# Patient Record
Sex: FEMALE | Race: WHITE | Employment: STUDENT | URBAN - METROPOLITAN AREA
[De-identification: names, ages, dates, MRNs, and addresses within clinical notes are randomized per-mention and may not be internally consistent; named-entity substitution may affect disease eponyms.]

---
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
## 2025-03-25 RX ORDER — SPIRONOLACTONE 50 MG/1
50 TABLET, FILM COATED ORAL 2 TIMES DAILY
Qty: 180 TABLET | Refills: 0 | OUTPATIENT
Start: 2025-03-25

## 2025-04-21 RX ORDER — SPIRONOLACTONE 50 MG/1
50 TABLET, FILM COATED ORAL 2 TIMES DAILY
Qty: 180 TABLET | Refills: 0 | OUTPATIENT
Start: 2025-04-21

## 2025-04-21 RX ORDER — MONTELUKAST SODIUM 10 MG/1
10 TABLET ORAL NIGHTLY
Qty: 90 TABLET | Refills: 0 | OUTPATIENT
Start: 2025-04-21

## 2025-04-23 ENCOUNTER — TELEPHONE (OUTPATIENT)
Dept: OBGYN CLINIC | Facility: CLINIC | Age: 20
End: 2025-04-23

## 2025-04-23 NOTE — TELEPHONE ENCOUNTER
Pt name and  verified     Pt reports partner has oral herpes and now pt possibly has it. Pt has kylena IUD inserted on 2023. Pt states she has a whole body rash for the past 2 months and wondering if related to the IUD or herpes. Pt denies starting new medications or other allergies. Informed pt the IUD was probably not the cause but pt should f/u with PCP for an evaluation of rash. Pt verbalized understanding and agreed.  Pt has not been seen since 2023. Pt scheduled for annual on  with Dr. Church. Pt aware of scheduling details.

## 2025-05-12 ENCOUNTER — OFFICE VISIT (OUTPATIENT)
Dept: FAMILY MEDICINE CLINIC | Facility: CLINIC | Age: 20
End: 2025-05-12
Payer: COMMERCIAL

## 2025-05-12 VITALS
RESPIRATION RATE: 18 BRPM | SYSTOLIC BLOOD PRESSURE: 106 MMHG | DIASTOLIC BLOOD PRESSURE: 70 MMHG | WEIGHT: 164 LBS | TEMPERATURE: 98 F | OXYGEN SATURATION: 98 % | HEART RATE: 95 BPM | HEIGHT: 70 IN | BODY MASS INDEX: 23.48 KG/M2

## 2025-05-12 DIAGNOSIS — J01.00 ACUTE NON-RECURRENT MAXILLARY SINUSITIS: Primary | ICD-10-CM

## 2025-05-12 DIAGNOSIS — J02.9 SORE THROAT: ICD-10-CM

## 2025-05-12 LAB
CONTROL LINE PRESENT WITH A CLEAR BACKGROUND (YES/NO): YES YES/NO
KIT LOT #: NORMAL NUMERIC

## 2025-05-12 RX ORDER — HYDROXYZINE HYDROCHLORIDE 25 MG/1
TABLET, FILM COATED ORAL
COMMUNITY
Start: 2025-03-19

## 2025-05-12 RX ORDER — AZITHROMYCIN 250 MG/1
TABLET, FILM COATED ORAL
COMMUNITY
Start: 2025-03-19 | End: 2025-05-12

## 2025-05-12 RX ORDER — AMOXICILLIN 500 MG/1
CAPSULE ORAL
COMMUNITY
End: 2025-05-12

## 2025-05-12 RX ORDER — FEXOFENADINE HCL 60 MG/1
60 TABLET, FILM COATED ORAL 2 TIMES DAILY
COMMUNITY

## 2025-05-12 RX ORDER — CEFADROXIL 500 MG/1
500 CAPSULE ORAL 2 TIMES DAILY
COMMUNITY
Start: 2025-02-02 | End: 2025-05-12

## 2025-05-12 NOTE — PROGRESS NOTES
CHIEF COMPLAINT:     Chief Complaint   Patient presents with    Sinus Problem     X 2 weeks, sore throat worsening 1 week ago, sinus pressure, mucus       HPI:   Yefri Melendez is a 19 year old female who presents for concerns of a sinus infection. Patient reports sinus pressure/congestion and thick nasal drainage along with sore throat. Sinus symtpoms x 2 weeks. .  Denies any fevers, wheezing, chest discomfort, or shortness of breath. .  Treating symptoms with otc cold meds.   Tolerates PO well at home. No n/v/d.  Denies any other aggravating or relieving factors at home. Denies any other treatment attempts prior to arrival.  Denies known covid-19 or strep exposure.      Current Medications[1]   Past Medical History[2]   Past Surgical History[3]      Short Social Hx on File[4]      REVIEW OF SYSTEMS:   GENERAL: Denies fever. Notes good appetite  SKIN: no rashes or abnormal skin lesions  HEENT: + sore throat, + sinus symptoms, Denies ear pain  LUNGS: Denies cough, denies shortness of breath or wheezing,   CARDIOVASCULAR: denies chest pain or palpitations   GI: denies N/V/C or abdominal pain  NEURO: Denies headaches    EXAM:   /70   Pulse 95   Temp 97.7 °F (36.5 °C)   Resp 18   Ht 5' 10\" (1.778 m)   Wt 164 lb (74.4 kg)   LMP 04/24/2025 (Exact Date)   SpO2 98%   BMI 23.53 kg/m²   GENERAL: well developed, well nourished,in no apparent distress  SKIN: no rashes,no suspicious lesions  HEAD: atraumatic, normocephalic.    EYES: conjunctiva clear, EOM intact  EARS: TM's intact and without erythema, no bulging, no retraction,no fluid, bony landmarks visualized. No erythema or swelling noted to ear canals or external ears.   NOSE: Nostrils patent, dried yellow nasal mucous, nasal mucosa reddened   THROAT: Oral mucosa pink, moist. Posterior pharynx is erythematous. No exudates. No tonsillar hypertrophy noted.  No trismus. Uvula midline with no swelling. Voice clear/normal. No stridor  NECK: Supple, non-tender  LUNGS:  clear to auscultation bilaterally, no rales, wheezes or rhonchi. Breathing is non labored.  CARDIO: RRR without murmur  EXTREMITIES: no cyanosis, clubbing or edema  LYMPH:  No lymphadenopathy.        ASSESSMENT AND PLAN:       ICD-10-CM    1. Acute non-recurrent maxillary sinusitis  J01.00 amoxicillin clavulanate 875-125 MG Oral Tab      2. Sore throat  J02.9 Strep A Assay W/Optic        Rapid strep negative    Discussed physical exam and hpi with pt. Pt has reassuring physical exam consistent with sinusitis. We discussed viral vs allergy vs bacterial etiologies associated with sinusitis. Pt notes she would like to start abx today and declined delayed antimicrobial therapy option. Treatment options discussed with patient and explained in detail. Will start augmentin today along with supportive care. (Will stop minocycline while taking augmentin). The risks, benefits and potential side effects of possible medications were reviewed. Alternatives were discussed. Monitoring parameters and expected course outlined. Patient to call PCP or go to emergency department if symptoms fail to respond as outlined, or worsen in any way. The patient agreed with the plan.       (We discussed her allergy to cephalosporins and the relationship between pcn's and cephalosporins. Pt verbalized understanding and notes she has tolerated augmentin well in the past. I verified on pt's chart she has had augmentin before.)       Patient Instructions   1. Rest. Drink plenty of fluids.     2. Supportive care as discussed.     3. Augmentin as prescribed. (Stop minocycline while taking augmentin. Resume after completion of augmentin.)    4. Follow up with PMD in 4-5 days for re-eval. Go to the emergency department immediately if symptoms worsen, change, you develop chest discomfort, wheezing, shortness of breath, or if you have any concerns.             [1]   Current Outpatient Medications   Medication Sig Dispense Refill    amoxicillin clavulanate  875-125 MG Oral Tab Take 1 tablet by mouth 2 (two) times daily for 10 days. 20 tablet 0    fexofenadine 60 MG Oral Tab Take 1 tablet (60 mg total) by mouth 2 (two) times daily. (Patient not taking: Reported on 5/12/2025)      hydrOXYzine 25 MG Oral Tab Take 1-2 tabs po q6 hrs prn itching/rash      spironolactone 50 MG Oral Tab Take 1 tablet (50 mg total) by mouth 2 (two) times daily. 180 tablet 0    montelukast (SINGULAIR) 10 MG Oral Tab Take 1 tablet (10 mg total) by mouth nightly. 90 tablet 0    minocycline 100 MG Oral Cap Take 1 capsule (100 mg total) by mouth daily. 30 capsule 2    levonorgestrel (KYLEENA) 19.5 MG Intrauterine IUD 19,500 mcg (1 each total) by Intrauterine route one time. Insertion 05/30/2023     [2]   Past Medical History:   Ileus following gastrointestinal surgery (HCC)    Perforated appendicitis   [3]   Past Surgical History:  Procedure Laterality Date    Appendectomy      Hernia surgery      Repair ing hernia,5+y/o,reducibl     [4]   Social History  Socioeconomic History    Marital status: Single   Tobacco Use    Smoking status: Never    Smokeless tobacco: Never   Vaping Use    Vaping status: Never Used   Substance and Sexual Activity    Alcohol use: Never    Drug use: Never     Social Drivers of Health      Received from [facility]    Housing Stability

## 2025-05-12 NOTE — PATIENT INSTRUCTIONS
1. Rest. Drink plenty of fluids.     2. Supportive care as discussed.     3. Augmentin as prescribed. (Stop minocycline while taking augmentin. Resume after completion of augmentin.)    4. Follow up with PMD in 4-5 days for re-eval. Go to the emergency department immediately if symptoms worsen, change, you develop chest discomfort, wheezing, shortness of breath, or if you have any concerns.

## 2025-05-22 ENCOUNTER — OFFICE VISIT (OUTPATIENT)
Dept: OBGYN CLINIC | Facility: CLINIC | Age: 20
End: 2025-05-22
Payer: COMMERCIAL

## 2025-05-22 VITALS
HEIGHT: 70 IN | WEIGHT: 166 LBS | SYSTOLIC BLOOD PRESSURE: 116 MMHG | BODY MASS INDEX: 23.77 KG/M2 | DIASTOLIC BLOOD PRESSURE: 74 MMHG

## 2025-05-22 DIAGNOSIS — T78.3XXA IDIOPATHIC ANGIOEDEMA, INITIAL ENCOUNTER: ICD-10-CM

## 2025-05-22 DIAGNOSIS — Z01.419 ENCOUNTER FOR WELL WOMAN EXAM WITH ROUTINE GYNECOLOGICAL EXAM: Primary | ICD-10-CM

## 2025-05-22 PROCEDURE — 99395 PREV VISIT EST AGE 18-39: CPT | Performed by: OBSTETRICS & GYNECOLOGY

## 2025-05-22 NOTE — PROGRESS NOTES
[facility]  Obstetrics and Gynecology  Gynecology Visit    Chief Complaint   Patient presents with    Annual     Reviewed Preventative/Wellness form with patient.           Yefri Melendez is a 19 year old female who presents for Pt presents for annual.    LMP: 4/24/2025.    Menses regular: yes.    Menstrual flow normal: normal.    Birth control or HRT:  Kyleena.   Refill 0  Last Pap Smear: 0.  Any history of abnormal paps: 0   Last MMG: n/a  Any Medication Refills needed today?: none  Sleep: 8-10 hours.    Diet: balanced.    Exercise: yes.   Screening labs/Blood work today: n/a.     Colonoscopy (if over 44 y/o): n/a.   Gardasil:(age 9-44 y/o) up to date.   Genetic Cancer screen (if indicated): n/a.   Flu (Aug-April): 2024.TDAP (every 10 years) 2016.      Additional Problems/concerns: no other concerns.      Next Appt: will call    Immunization History   Administered Date(s) Administered    Covid-19 Vaccine Pfizer 30 mcg/0.3 ml 05/26/2021, 06/16/2021    Covid-19 Vaccine Pfizer Pablo-Sucrose 30 mcg/0.3 ml 02/22/2022    DTAP 12/18/2006    DTAP-IPV 05/17/2010    DTAP/HEP B/IPV Combined 08/17/2005, 10/12/2005, 12/12/2005    FLULAVAL 6 months & older 0.5 ml Prefilled syringe (78043) 10/27/2022    FLUZONE 6 months and older PFS 0.5 ml (73829) 09/21/2013, 09/12/2015, 10/29/2016, 10/14/2019, 10/04/2020, 11/01/2021    Fluvirin, 3 Years & >, Im 09/27/2011, 10/20/2012, 09/21/2013, 09/17/2014    HEP A,Ped/Adol,(2 Dose) 06/13/2007    HEP A,Ped/Adol,(3 Dose) 06/12/2006    HIB 08/17/2005, 10/12/2005, 12/18/2006    Hpv Virus Vaccine 9 Corina Im 07/25/2016, 01/28/2017    Influenza 10/20/2012    MMR 09/13/2006    MMR/Varicella Combined 05/17/2010    Meningococcal B, Omv 07/05/2023    Meningococcal-Menactra 07/25/2016, 07/14/2021    Pneumococcal (Prevnar 13) 05/17/2010    Pneumococcal (Prevnar 7) 08/17/2005, 10/12/2005, 12/12/2005, 09/13/2006    TDAP 07/25/2016    Varicella 06/12/2006       Medications - Current[1]    Allergies[2]    OB  History    Para Term  AB Living   1 0 0 0 0 0   SAB IAB Ectopic Multiple Live Births   0 0 0 0 0      # Outcome Date GA Lbr Michael/2nd Weight Sex Type Anes PTL Lv   1                 Gyn History       Menarche: 14 (2025 10:07 AM)  Period Cycle (Days): 28 (2025 10:07 AM)  Period Duration (Days): 3 (2025 10:07 AM)  Period Flow: heavy to moderate (2025 10:07 AM)  Use of Birth Control (if yes, specify type): Kyleena IUD (2025 10:07 AM)         No data to display                    Past Medical History[3]    Past Surgical History[4]    Family History[5]     Tobacco  Allergies  Meds  Med Hx  Surg Hx  Fam Hx  Soc Hx        Social History     Socioeconomic History    Marital status: Single     Spouse name: Not on file    Number of children: Not on file    Years of education: Not on file    Highest education level: Not on file   Occupational History    Not on file   Tobacco Use    Smoking status: Never    Smokeless tobacco: Never   Vaping Use    Vaping status: Never Used   Substance and Sexual Activity    Alcohol use: Never    Drug use: Never    Sexual activity: Not on file   Other Topics Concern    Not on file   Social History Narrative    Not on file     Social Drivers of Health     Food Insecurity: No Food Insecurity (2025)    NCSS - Food Insecurity     Worried About Running Out of Food in the Last Year: No     Ran Out of Food in the Last Year: No   Transportation Needs: No Transportation Needs (2025)    NCSS - Transportation     Lack of Transportation: No   Stress: Not on file   Housing Stability: Not At Risk (2025)    NCSS - Housing/Utilities     Has Housing: Yes     Worried About Losing Housing: No     Unable to Get Utilities: No     /74   Ht 5' 10\" (1.778 m)   Wt 166 lb (75.3 kg)   LMP 2025 (Exact Date)   BMI 23.82 kg/m²     Wt Readings from Last 3 Encounters:   25 166 lb (75.3 kg) (90%, Z= 1.28)*   25 164 lb (74.4 kg) (89%, Z=  1.23)*   07/05/23 166 lb (75.3 kg) (92%, Z= 1.41)*     * Growth percentiles are based on CDC (Girls, 2-20 Years) data.         Health Maintenance   Topic Date Due    Influenza Vaccine (1) 08/01/2021    Screen for Cervical Cancer 11/05/2021    DTaP,Tdap and Td Vaccines (3 - Td or Tdap) 03/18/2025    Hepatitis C Screening Completed    HIV Screening Completed    COVID-19 Vaccine Completed     Review of Systems   General: Present- Feeling well. Not Present- Chills, Fever, Weight Gain and Weight Loss.  HEENT: Not Present- Headache and Sore Throat.  Respiratory: Not Present- Cough, Difficulty Breathing, Hemoptysis and Sputum Production.  Cardiovascular: Not Present- Chest Pain, Elevated Blood Pressure, Fainting / Blacking Out and Shortness of Breath.  Gastrointestinal: Not Present- Constipation, Diarrhea, Nausea and Vomiting.  Female Genitourinary: Not Present- Discharge, Dysuria and Frequency.  Musculoskeletal: Not Present- Leg Cramps and Swelling of Extremities.  Neurological: Not Present- Dizziness and Headaches.  Psychiatric: Not Present- Anxiety and Depression.  Endocrine: Not Present- Appetite Changes, Hair Changes and Thyroid Problems.  Hematology: Not Present- Easy Bruising and Excessive bleeding.  All other systems negative     Physical Exam The physical exam findings are as follows:     General   Mental Status - Alert. General Appearance - Cooperative. Orientation - Oriented X4. Build & Nutrition - Well nourished.    Head and Neck  Thyroid   Gland Characteristics - normal size and consistency.    Chest and Lung Exam   Inspection:   Chest Wall: - Normal.  Percussion:   Quality and Intensity: - Percussion normal.  Palpation: - Palpation normal.  Auscultation:   Breath sounds: - Normal.  Adventitious sounds: - No Adventitious sounds.    Breast   Nipples: Characteristics - Bilateral - Normal. Discharge - Bilateral - None.  Breast - Bilateral - Symmetric.    Cardiovascular   Auscultation: Rhythm - Regular. Heart  Sounds - Normal heart sounds.  Murmurs & Other Heart Sounds: Auscultation of the heart reveals - No Murmurs.    Abdomen   Inspection: Inspection of the abdomen reveals - No Hernias. Incisional scars - no incisional scars.  Palpation/Percussion: Palpation and Percussion of the abdomen reveal - Non Tender and No Palpable abdominal masses.  Liver: - Normal.  Auscultation: Auscultation of the abdomen reveals - Bowel sounds normal.    Female Genitourinary     External Genitalia   Perineum - Normal. Bartholin's Gland - Bilateral - Normal. Clitoris - Normal.  Introitus: Characteristics - No Cystocele, Enterocele or Rectocele. Discharge - None.  Labia Majora: Lesions - Bilateral - None. Characteristics - Bilateral - Normal.  Labia Minora: Lesions - Bilateral - None. Characteristics - Bilateral - Normal.  Urethra: Characteristics - Normal. Discharge - None.  [place] Gland - Bilateral - Normal.  Vulva: Characteristics - Normal. Lesions - None.    Speculum & Bimanual   Vagina:   Vaginal Wall: - Normal.  Vaginal Lesions - None. Vaginal Mucosa - Normal.  Cervix: Characteristics - No Motion tenderness. Discharge - None. Iud at os   Uterus: Characteristics - Normal. Position - Midposition.  Adnexa: Characteristics - Bilateral - Normal. Masses - No Adnexal Masses.      Rectal   Anorectal Exam: External - normal external exam.    Peripheral Vascular   Upper Extremity:   Palpation: - Pulses bilaterally normal.  Lower Extremity: Inspection - Bilateral - Inspection Normal.  Palpation: Edema - Bilateral - No edema.    Neurologic   Mental Status: - Normal.    Lymphatic  General Lymphatics   Description - Normal .     Will try divigel during menses to see if improved angioedema - plan to try antihistamines and topical anti itch treatments as well  Lesbian relationships - gc/ch not needed     1. Encounter for well woman exam with routine gynecological exam    2. Idiopathic angioedema, initial encounter                            [1]    Current Outpatient Medications:     spironolactone 50 MG Oral Tab, Take 1 tablet (50 mg total) by mouth 2 (two) times daily., Disp: 180 tablet, Rfl: 0    montelukast (SINGULAIR) 10 MG Oral Tab, Take 1 tablet (10 mg total) by mouth nightly., Disp: 90 tablet, Rfl: 0    minocycline 100 MG Oral Cap, Take 1 capsule (100 mg total) by mouth daily., Disp: 30 capsule, Rfl: 2    levonorgestrel (KYLEENA) 19.5 MG Intrauterine IUD, 19,500 mcg (1 each total) by Intrauterine route one time. Insertion 05/30/2023, Disp: , Rfl:     fexofenadine 60 MG Oral Tab, Take 1 tablet (60 mg total) by mouth 2 (two) times daily. (Patient not taking: Reported on 5/22/2025), Disp: , Rfl:     hydrOXYzine 25 MG Oral Tab, Take 1-2 tabs po q6 hrs prn itching/rash (Patient not taking: Reported on 5/22/2025), Disp: , Rfl:     amoxicillin clavulanate 875-125 MG Oral Tab, Take 1 tablet by mouth 2 (two) times daily for 10 days. (Patient not taking: Reported on 5/22/2025), Disp: 20 tablet, Rfl: 0  [2]   Allergies  Allergen Reactions    Cefdinir HIVES   [3]   Past Medical History:   Dysmenorrhea    Ileus following gastrointestinal surgery (HCC)    Perforated appendicitis   [4]   Past Surgical History:  Procedure Laterality Date    Appendectomy      Appendectomy  10/17    Hernia surgery      Repair ing hernia,5+y/o,reducibl     [5]   Family History  Problem Relation Age of Onset    Other (kidney) Maternal Grandfather

## 2025-05-27 ENCOUNTER — OFFICE VISIT (OUTPATIENT)
Dept: FAMILY MEDICINE CLINIC | Facility: CLINIC | Age: 20
End: 2025-05-27
Payer: COMMERCIAL

## 2025-05-27 VITALS
HEART RATE: 91 BPM | OXYGEN SATURATION: 98 % | RESPIRATION RATE: 15 BRPM | SYSTOLIC BLOOD PRESSURE: 122 MMHG | BODY MASS INDEX: 24.2 KG/M2 | DIASTOLIC BLOOD PRESSURE: 72 MMHG | HEIGHT: 70 IN | WEIGHT: 169 LBS

## 2025-05-27 DIAGNOSIS — J30.89 NON-SEASONAL ALLERGIC RHINITIS, UNSPECIFIED TRIGGER: Primary | ICD-10-CM

## 2025-05-27 DIAGNOSIS — L70.0 ACNE VULGARIS: ICD-10-CM

## 2025-05-27 PROCEDURE — 99213 OFFICE O/P EST LOW 20 MIN: CPT | Performed by: FAMILY MEDICINE

## 2025-05-27 RX ORDER — MONTELUKAST SODIUM 10 MG/1
10 TABLET ORAL NIGHTLY
Qty: 90 TABLET | Refills: 3 | Status: SHIPPED | OUTPATIENT
Start: 2025-05-27

## 2025-05-27 RX ORDER — SPIRONOLACTONE 50 MG/1
50 TABLET, FILM COATED ORAL 2 TIMES DAILY
Qty: 180 TABLET | Refills: 3 | Status: SHIPPED | OUTPATIENT
Start: 2025-05-27

## 2025-05-27 NOTE — PROGRESS NOTES
[facility] Progress Note    SUBJECTIVE: Yefri Melendez 19 year old female is here today for   Chief Complaint   Patient presents with    Rash     Gets rash a week prior to getting her menstrual cycle for the last 3 months. Has had IUD for 3 years   Wants to know if maybe she can do allergy testing to see if it is environment or food related.     Allergic Rhinitis     Needs refill on singulair       No side effects of medications,     Has been getting a rash, this would be 3rd month if it comes back. Seems to be linked to menstrual cycle, Will get the rash at early cycle, will be snadpaper all over, occurred the last two months, and then recurred.    Did respond well  to steroids.    Was told to try claritin, and hasn't had a rash this week. Had been off of the montelukast.    Doing well at school in [place].    Has been working out there, and thinks will ikely stay.    PMH  Past Medical History[1]     PSH  Past Surgical History[2]     Social Hx:  See HPI    ROS  See HPI    OBJECTIVE:  /72   Pulse 91   Resp 15   Ht 5' 10\" (1.778 m)   Wt 169 lb (76.7 kg)   LMP 04/24/2025 (Exact Date)   SpO2 98%   Breastfeeding No   BMI 24.25 kg/m²           Labs:          Meds:   Current Medications[3]      Assessment/Plan  Yefri was seen today for rash and allergic rhinitis.    Diagnoses and all orders for this visit:    Non-seasonal allergic rhinitis, unspecified trigger  -     montelukast (SINGULAIR) 10 MG Oral Tab; Take 1 tablet (10 mg total) by mouth nightly.    Acne vulgaris  -     spironolactone 50 MG Oral Tab; Take 1 tablet (50 mg total) by mouth 2 (two) times daily.         Overall doing well, will refill medications. TO let me know if ongoing issues with rash         Total Time spent with patient and coordinating care:  15 minutes.    Follow up: as needed      Travon Lemus MD         [1]   Past Medical History:   Dysmenorrhea    Ileus following gastrointestinal surgery (HCC)    Perforated appendicitis    [2]   Past Surgical History:  Procedure Laterality Date    Appendectomy      Appendectomy  10/17    Hernia surgery      Repair ing hernia,5+y/o,reducibl     [3]   Current Outpatient Medications   Medication Sig Dispense Refill    montelukast (SINGULAIR) 10 MG Oral Tab Take 1 tablet (10 mg total) by mouth nightly. 90 tablet 3    spironolactone 50 MG Oral Tab Take 1 tablet (50 mg total) by mouth 2 (two) times daily. 180 tablet 3    levonorgestrel (KYLEENA) 19.5 MG Intrauterine IUD 19,500 mcg (1 each total) by Intrauterine route one time. Insertion 05/30/2023

## 2025-07-12 DIAGNOSIS — L70.0 ACNE VULGARIS: ICD-10-CM

## 2025-07-12 DIAGNOSIS — J30.89 NON-SEASONAL ALLERGIC RHINITIS, UNSPECIFIED TRIGGER: ICD-10-CM

## 2025-07-14 RX ORDER — SPIRONOLACTONE 50 MG/1
50 TABLET, FILM COATED ORAL 2 TIMES DAILY
Qty: 180 TABLET | Refills: 3 | Status: SHIPPED | OUTPATIENT
Start: 2025-07-14

## 2025-07-14 RX ORDER — MONTELUKAST SODIUM 10 MG/1
10 TABLET ORAL NIGHTLY
Qty: 90 TABLET | Refills: 3 | Status: SHIPPED | OUTPATIENT
Start: 2025-07-14

## (undated) DEVICE — STERILE POLYISOPRENE POWDER-FREE SURGICAL GLOVES: Brand: PROTEXIS

## (undated) DEVICE — TRAY SURESTEP 16 BARDEX UMETR

## (undated) DEVICE — MEGADYNE ELECTRODE ADULT PT RT

## (undated) DEVICE — LAP CHOLE/APPY CDS-LF: Brand: MEDLINE INDUSTRIES, INC.

## (undated) DEVICE — TROCAR: Brand: KII FIOS FIRST ENTRY

## (undated) DEVICE — THE ECHELON, ECHELON ENDOPATH™ AND ECHELON FLEX™ FAMILIES OF ENDOSCOPIC LINEAR CUTTERS AND RELOADS ARE STERILE, SINGLE PATIENT USE INSTRUMENTS THAT SIMULTANEOUSLY CUT AND STAPLE TISSUE. THERE ARE SIX STAGGERED ROWS OF STAPLES, THREE ON EITHER SIDE OF THE CUT LINE. THE 45 MM INSTRUMENTS HAVE A STAPLE LINE THATIS APPROXIMATELY 45 MM LONG AND A CUT LINE THAT IS APPROXIMATELY 42 MM LONG. THE SHAFT CAN ROTATE FREELY IN BOTH DIRECTIONS AND AN ARTICULATION MECHANISM ON ARTICULATING INSTRUMENTS ENABLES BENDING THE DISTAL PORTIONOF THE SHAFT TO FACILITATE LATERAL ACCESS OF THE OPERATIVE SITE.THE INSTRUMENTS ARE SHIPPED WITHOUT A RELOAD AND MUST BE LOADED PRIOR TO USE. A STAPLE RETAINING CAP ON THE RELOAD PROTECTS THE STAPLE LEG POINTS DURING SHIPPING AND TRANSPORTATION. THE INSTRUMENTS’ LOCK-OUT FEATURE IS DESIGNED TO PREVENT A USED RELOAD FROM BEING REFIRED.: Brand: ECHELON ENDOPATH

## (undated) DEVICE — LIGHT HANDLE

## (undated) DEVICE — DRAIN CHANNEL 19FR BLAKE

## (undated) DEVICE — CLOSURE EXOFIN 1.0ML

## (undated) DEVICE — BNDG ADH W1INXL3IN NAT FAB N

## (undated) DEVICE — TROCAR: Brand: KII SHIELDED BLADED ACCESS SYSTEM

## (undated) DEVICE — UNDYED BRAIDED (POLYGLACTIN 910), SYNTHETIC ABSORBABLE SUTURE: Brand: COATED VICRYL

## (undated) DEVICE — SOLUTION  .9 1000ML BTL

## (undated) DEVICE — ENSEAL X1 TISSUE SEALER, CURVED JAW, 37 CM SHAFT LENGTH: Brand: ENSEAL

## (undated) DEVICE — APPLICATOR COTTON TIP 6IN

## (undated) DEVICE — THE ECHELON FLEX POWERED PLUS ARTICULATING ENDOSCOPIC LINEAR CUTTERS ARE STERILE, SINGLE PATIENT USE INSTRUMENTS THAT SIMULTANEOUSLYCUT AND STAPLE TISSUE. THERE ARE SIX STAGGERED ROWS OF STAPLES, THREE ON EITHER SIDE OF THE CUT LINE. THE ECHELON FLEX 45 POWERED PLUSINSTRUMENTS HAVE A STAPLE LINE THAT IS APPROXIMATELY 45 MM LONG AND A CUT LINE THAT IS APPROXIMATELY 42 MM LONG. THE SHAFT CAN ROTATE FREELYIN BOTH DIRECTIONS AND AN ARTICULATION MECHANISM ENABLES THE DISTAL PORTION OF THE SHAFT TO PIVOT TO FACILITATE LATERAL ACCESS TO THE OPERATIVESITE.THE INSTRUMENTS ARE PACKAGED WITH A PRIMARY LITHIUM BATTERY PACK THAT MUST BE INSTALLED PRIOR TO USE. THERE ARE SPECIFIC REQUIREMENTS FORDISPOSING OF THE BATTERY PACK. REFER TO THE BATTERY PACK DISPOSAL SECTION.THE INSTRUMENTS ARE PACKAGED WITHOUT A RELOAD AND MUST BE LOADED PRIOR TO USE. A STAPLE RETAINING CAP ON THE RELOAD PROTECTS THE STAPLE LEGPOINTS DURING SHIPPING AND TRANSPORTATION. THE INSTRUMENTS’ LOCK-OUT FEATURE IS DESIGNED TO PREVENT A USED OR IMPROPERLY INSTALLED RELOADFROM BEING REFIRED OR AN INSTRUMENT FROM BEING FIRED WITHOUT A RELOAD.: Brand: ECHELON FLEX

## (undated) DEVICE — HEX-LOCKING BLADE ELECTRODE: Brand: EDGE

## (undated) DEVICE — SUT ETHILON 3-0 PS-1 1663H

## (undated) DEVICE — DISPOSABLE GRASPER: Brand: EPIX LAPAROSCOPIC GRASPER

## (undated) DEVICE — ENDOPATH ULTRA VERESS INSUFFLATION NEEDLES WITH LUER LOCK CONNECTORS: Brand: ENDOPATH

## (undated) DEVICE — EVACUATOR URO RELIVAC 100CC

## (undated) DEVICE — SLEEVE KENDALL SCD EXPRESS MED

## (undated) DEVICE — TISSUE RETRIEVAL SYSTEM: Brand: INZII RETRIEVAL SYSTEM

## (undated) DEVICE — SUT VICRYL 0 UR-6 J603H

## (undated) DEVICE — 40580 - THE PINK PAD - ADVANCED TRENDELENBURG POSITIONING KIT: Brand: 40580 - THE PINK PAD - ADVANCED TRENDELENBURG POSITIONING KIT

## (undated) DEVICE — TRAP MUCUS 20ML

## (undated) NOTE — LETTER
Date & Time: 4/28/2018, 7:15 PM  Patient: Dsetiny Gum  Encounter Provider(s):    Luz Maria Pozo MD       To Whom It May Concern:    Destiny Taylor was seen and treated in our department on 4/28/2018.  She should not participate in gym/sports until until cl

## (undated) NOTE — LETTER
Date & Time: 4/28/2018, 7:29 PM  Patient: Patricia Ceron  Encounter Provider(s):    Rowena Hinojosa MD       To Whom It May Concern:    Patricia Ceron was seen and treated in our department on 4/28/2018.  She should not participate in gym or sports until clear

## (undated) NOTE — ED AVS SNAPSHOT
Lio Castellano   MRN: [id number]    Department:  [facility] Emergency Department   Date of Visit:  4/28/2018           Disclosure     Insurance plans vary and the physician(s) referred by the ER may not be covered by your plan.  Please contact your ins tell this physician (or your personal doctor if your instructions are to return to your personal doctor) about any new or lasting problems. The primary care or specialist physician will see patients referred from the [facility] Emergency Department.  Pamela Melvin